# Patient Record
Sex: FEMALE | Race: WHITE | NOT HISPANIC OR LATINO | Employment: OTHER | URBAN - METROPOLITAN AREA
[De-identification: names, ages, dates, MRNs, and addresses within clinical notes are randomized per-mention and may not be internally consistent; named-entity substitution may affect disease eponyms.]

---

## 2022-03-30 ENCOUNTER — HOSPITAL ENCOUNTER (OUTPATIENT)
Facility: CLINIC | Age: 72
Setting detail: OBSERVATION
Discharge: HOME | End: 2022-03-31
Attending: EMERGENCY MEDICINE | Admitting: HOSPITALIST
Payer: MEDICARE

## 2022-03-30 ENCOUNTER — APPOINTMENT (OUTPATIENT)
Dept: GENERAL RADIOLOGY | Facility: CLINIC | Age: 72
End: 2022-03-30
Attending: EMERGENCY MEDICINE
Payer: MEDICARE

## 2022-03-30 DIAGNOSIS — S42.215A CLOSED NONDISPLACED FRACTURE OF SURGICAL NECK OF LEFT HUMERUS, UNSPECIFIED FRACTURE MORPHOLOGY, INITIAL ENCOUNTER: ICD-10-CM

## 2022-03-30 DIAGNOSIS — S42.255A CLOSED NONDISPLACED FRACTURE OF GREATER TUBEROSITY OF LEFT HUMERUS, INITIAL ENCOUNTER: ICD-10-CM

## 2022-03-30 PROCEDURE — 96374 THER/PROPH/DIAG INJ IV PUSH: CPT

## 2022-03-30 PROCEDURE — 73030 X-RAY EXAM OF SHOULDER: CPT | Mod: LT

## 2022-03-30 PROCEDURE — 250N000011 HC RX IP 250 OP 636: Performed by: EMERGENCY MEDICINE

## 2022-03-30 PROCEDURE — 250N000013 HC RX MED GY IP 250 OP 250 PS 637: Performed by: EMERGENCY MEDICINE

## 2022-03-30 PROCEDURE — 96376 TX/PRO/DX INJ SAME DRUG ADON: CPT

## 2022-03-30 PROCEDURE — 99285 EMERGENCY DEPT VISIT HI MDM: CPT | Mod: 25

## 2022-03-30 PROCEDURE — C9803 HOPD COVID-19 SPEC COLLECT: HCPCS

## 2022-03-30 RX ORDER — HYDROMORPHONE HYDROCHLORIDE 1 MG/ML
0.5 INJECTION, SOLUTION INTRAMUSCULAR; INTRAVENOUS; SUBCUTANEOUS
Status: CANCELLED | OUTPATIENT
Start: 2022-03-30

## 2022-03-30 RX ORDER — OXYCODONE AND ACETAMINOPHEN 5; 325 MG/1; MG/1
1 TABLET ORAL ONCE
Status: COMPLETED | OUTPATIENT
Start: 2022-03-30 | End: 2022-03-30

## 2022-03-30 RX ORDER — HYDROMORPHONE HYDROCHLORIDE 1 MG/ML
0.5 INJECTION, SOLUTION INTRAMUSCULAR; INTRAVENOUS; SUBCUTANEOUS
Status: DISCONTINUED | OUTPATIENT
Start: 2022-03-30 | End: 2022-03-31 | Stop reason: ALTCHOICE

## 2022-03-30 RX ADMIN — OXYCODONE HYDROCHLORIDE AND ACETAMINOPHEN 1 TABLET: 5; 325 TABLET ORAL at 23:47

## 2022-03-30 RX ADMIN — HYDROMORPHONE HYDROCHLORIDE 0.5 MG: 1 INJECTION, SOLUTION INTRAMUSCULAR; INTRAVENOUS; SUBCUTANEOUS at 23:18

## 2022-03-30 RX ADMIN — HYDROMORPHONE HYDROCHLORIDE 0.5 MG: 1 INJECTION, SOLUTION INTRAMUSCULAR; INTRAVENOUS; SUBCUTANEOUS at 22:45

## 2022-03-30 ASSESSMENT — ENCOUNTER SYMPTOMS
WEAKNESS: 0
ARTHRALGIAS: 1
NUMBNESS: 0

## 2022-03-31 ENCOUNTER — APPOINTMENT (OUTPATIENT)
Dept: OCCUPATIONAL THERAPY | Facility: CLINIC | Age: 72
End: 2022-03-31
Attending: PHYSICIAN ASSISTANT
Payer: MEDICARE

## 2022-03-31 VITALS
SYSTOLIC BLOOD PRESSURE: 142 MMHG | HEIGHT: 62 IN | DIASTOLIC BLOOD PRESSURE: 82 MMHG | HEART RATE: 74 BPM | OXYGEN SATURATION: 95 % | TEMPERATURE: 99.6 F | RESPIRATION RATE: 18 BRPM | BODY MASS INDEX: 37.73 KG/M2 | WEIGHT: 205 LBS

## 2022-03-31 PROBLEM — S42.215A CLOSED NONDISPLACED FRACTURE OF SURGICAL NECK OF LEFT HUMERUS, UNSPECIFIED FRACTURE MORPHOLOGY, INITIAL ENCOUNTER: Status: ACTIVE | Noted: 2022-03-31

## 2022-03-31 PROBLEM — S42.255A CLOSED NONDISPLACED FRACTURE OF GREATER TUBEROSITY OF LEFT HUMERUS, INITIAL ENCOUNTER: Status: ACTIVE | Noted: 2022-03-31

## 2022-03-31 LAB
ANION GAP SERPL CALCULATED.3IONS-SCNC: 10 MMOL/L (ref 3–14)
BASOPHILS # BLD AUTO: 0.1 10E3/UL (ref 0–0.2)
BASOPHILS NFR BLD AUTO: 1 %
BUN SERPL-MCNC: 14 MG/DL (ref 7–30)
CALCIUM SERPL-MCNC: 9.5 MG/DL (ref 8.5–10.1)
CHLORIDE BLD-SCNC: 95 MMOL/L (ref 94–109)
CO2 SERPL-SCNC: 27 MMOL/L (ref 20–32)
CREAT SERPL-MCNC: 0.77 MG/DL (ref 0.52–1.04)
EOSINOPHIL # BLD AUTO: 0 10E3/UL (ref 0–0.7)
EOSINOPHIL NFR BLD AUTO: 0 %
ERYTHROCYTE [DISTWIDTH] IN BLOOD BY AUTOMATED COUNT: 12.4 % (ref 10–15)
GFR SERPL CREATININE-BSD FRML MDRD: 82 ML/MIN/1.73M2
GLUCOSE BLD-MCNC: 145 MG/DL (ref 70–99)
HCT VFR BLD AUTO: 40.1 % (ref 35–47)
HGB BLD-MCNC: 12.9 G/DL (ref 11.7–15.7)
IMM GRANULOCYTES # BLD: 0.1 10E3/UL
IMM GRANULOCYTES NFR BLD: 1 %
LYMPHOCYTES # BLD AUTO: 1.1 10E3/UL (ref 0.8–5.3)
LYMPHOCYTES NFR BLD AUTO: 8 %
MCH RBC QN AUTO: 31.2 PG (ref 26.5–33)
MCHC RBC AUTO-ENTMCNC: 32.2 G/DL (ref 31.5–36.5)
MCV RBC AUTO: 97 FL (ref 78–100)
MONOCYTES # BLD AUTO: 0.8 10E3/UL (ref 0–1.3)
MONOCYTES NFR BLD AUTO: 6 %
NEUTROPHILS # BLD AUTO: 12.5 10E3/UL (ref 1.6–8.3)
NEUTROPHILS NFR BLD AUTO: 84 %
NRBC # BLD AUTO: 0 10E3/UL
NRBC BLD AUTO-RTO: 0 /100
PLAT MORPH BLD: NORMAL
PLATELET # BLD AUTO: 206 10E3/UL (ref 150–450)
POTASSIUM BLD-SCNC: 4 MMOL/L (ref 3.4–5.3)
RBC # BLD AUTO: 4.14 10E6/UL (ref 3.8–5.2)
RBC MORPH BLD: NORMAL
SARS-COV-2 RNA RESP QL NAA+PROBE: NEGATIVE
SODIUM SERPL-SCNC: 132 MMOL/L (ref 133–144)
WBC # BLD AUTO: 14.6 10E3/UL (ref 4–11)

## 2022-03-31 PROCEDURE — G0378 HOSPITAL OBSERVATION PER HR: HCPCS

## 2022-03-31 PROCEDURE — 36415 COLL VENOUS BLD VENIPUNCTURE: CPT | Performed by: EMERGENCY MEDICINE

## 2022-03-31 PROCEDURE — 80048 BASIC METABOLIC PNL TOTAL CA: CPT | Performed by: EMERGENCY MEDICINE

## 2022-03-31 PROCEDURE — U0005 INFEC AGEN DETEC AMPLI PROBE: HCPCS | Performed by: EMERGENCY MEDICINE

## 2022-03-31 PROCEDURE — 97535 SELF CARE MNGMENT TRAINING: CPT | Mod: GO

## 2022-03-31 PROCEDURE — 97165 OT EVAL LOW COMPLEX 30 MIN: CPT | Mod: GO

## 2022-03-31 PROCEDURE — 85025 COMPLETE CBC W/AUTO DIFF WBC: CPT | Performed by: EMERGENCY MEDICINE

## 2022-03-31 PROCEDURE — 250N000013 HC RX MED GY IP 250 OP 250 PS 637: Performed by: HOSPITALIST

## 2022-03-31 PROCEDURE — 99234 HOSP IP/OBS SM DT SF/LOW 45: CPT | Performed by: HOSPITALIST

## 2022-03-31 RX ORDER — ACETAMINOPHEN 325 MG/1
650 TABLET ORAL EVERY 6 HOURS PRN
Qty: 40 TABLET | Refills: 0 | Status: SHIPPED | OUTPATIENT
Start: 2022-03-31

## 2022-03-31 RX ORDER — TRIAMTERENE AND HYDROCHLOROTHIAZIDE 37.5; 25 MG/1; MG/1
1 CAPSULE ORAL DAILY
Status: DISCONTINUED | OUTPATIENT
Start: 2022-03-31 | End: 2022-03-31

## 2022-03-31 RX ORDER — NALOXONE HYDROCHLORIDE 0.4 MG/ML
0.2 INJECTION, SOLUTION INTRAMUSCULAR; INTRAVENOUS; SUBCUTANEOUS
Status: DISCONTINUED | OUTPATIENT
Start: 2022-03-31 | End: 2022-03-31 | Stop reason: HOSPADM

## 2022-03-31 RX ORDER — SIMVASTATIN 40 MG
80 TABLET ORAL AT BEDTIME
Status: DISCONTINUED | OUTPATIENT
Start: 2022-03-31 | End: 2022-03-31 | Stop reason: HOSPADM

## 2022-03-31 RX ORDER — SIMVASTATIN 80 MG
TABLET ORAL AT BEDTIME
COMMUNITY

## 2022-03-31 RX ORDER — LOSARTAN POTASSIUM 25 MG/1
25 TABLET ORAL DAILY
Status: DISCONTINUED | OUTPATIENT
Start: 2022-03-31 | End: 2022-03-31 | Stop reason: HOSPADM

## 2022-03-31 RX ORDER — IBUPROFEN 400 MG/1
400 TABLET, FILM COATED ORAL EVERY 8 HOURS PRN
Qty: 20 TABLET | Refills: 0 | Status: SHIPPED | OUTPATIENT
Start: 2022-03-31 | End: 2022-04-07

## 2022-03-31 RX ORDER — OMEGA-3 FATTY ACIDS/FISH OIL 300-1000MG
600-800 CAPSULE ORAL EVERY 6 HOURS PRN
COMMUNITY
Start: 2022-03-31 | End: 2022-03-31

## 2022-03-31 RX ORDER — ATENOLOL 100 MG/1
100 TABLET ORAL DAILY
COMMUNITY

## 2022-03-31 RX ORDER — MULTIPLE VITAMINS W/ MINERALS TAB 9MG-400MCG
1 TAB ORAL DAILY
COMMUNITY

## 2022-03-31 RX ORDER — OMEGA-3 FATTY ACIDS/FISH OIL 300-1000MG
200-400 CAPSULE ORAL EVERY 6 HOURS PRN
Status: ON HOLD | COMMUNITY
End: 2022-03-31

## 2022-03-31 RX ORDER — ATENOLOL 25 MG/1
100 TABLET ORAL DAILY
Status: DISCONTINUED | OUTPATIENT
Start: 2022-03-31 | End: 2022-03-31 | Stop reason: HOSPADM

## 2022-03-31 RX ORDER — TRIAMTERENE/HYDROCHLOROTHIAZID 37.5-25 MG
1 TABLET ORAL DAILY
Status: DISCONTINUED | OUTPATIENT
Start: 2022-03-31 | End: 2022-03-31 | Stop reason: HOSPADM

## 2022-03-31 RX ORDER — LOSARTAN POTASSIUM 25 MG/1
25 TABLET ORAL DAILY
COMMUNITY

## 2022-03-31 RX ORDER — ACETAMINOPHEN 325 MG/1
650 TABLET ORAL EVERY 6 HOURS PRN
COMMUNITY
Start: 2022-03-31 | End: 2022-03-31

## 2022-03-31 RX ORDER — TRIAMTERENE AND HYDROCHLOROTHIAZIDE 37.5; 25 MG/1; MG/1
1 CAPSULE ORAL DAILY
COMMUNITY

## 2022-03-31 RX ORDER — NALOXONE HYDROCHLORIDE 0.4 MG/ML
0.4 INJECTION, SOLUTION INTRAMUSCULAR; INTRAVENOUS; SUBCUTANEOUS
Status: DISCONTINUED | OUTPATIENT
Start: 2022-03-31 | End: 2022-03-31 | Stop reason: HOSPADM

## 2022-03-31 RX ORDER — OXYCODONE HYDROCHLORIDE 5 MG/1
5 TABLET ORAL EVERY 4 HOURS PRN
Qty: 20 TABLET | Refills: 0 | Status: SHIPPED | OUTPATIENT
Start: 2022-03-31

## 2022-03-31 RX ORDER — ONDANSETRON 2 MG/ML
4 INJECTION INTRAMUSCULAR; INTRAVENOUS EVERY 6 HOURS PRN
Status: DISCONTINUED | OUTPATIENT
Start: 2022-03-31 | End: 2022-03-31 | Stop reason: HOSPADM

## 2022-03-31 RX ORDER — ACETAMINOPHEN 650 MG/1
650 SUPPOSITORY RECTAL EVERY 6 HOURS PRN
Status: DISCONTINUED | OUTPATIENT
Start: 2022-03-31 | End: 2022-03-31 | Stop reason: HOSPADM

## 2022-03-31 RX ORDER — ONDANSETRON 4 MG/1
4 TABLET, ORALLY DISINTEGRATING ORAL EVERY 6 HOURS PRN
Status: DISCONTINUED | OUTPATIENT
Start: 2022-03-31 | End: 2022-03-31 | Stop reason: HOSPADM

## 2022-03-31 RX ORDER — OXYCODONE HYDROCHLORIDE 5 MG/1
5 TABLET ORAL EVERY 4 HOURS PRN
Status: DISCONTINUED | OUTPATIENT
Start: 2022-03-31 | End: 2022-03-31 | Stop reason: HOSPADM

## 2022-03-31 RX ORDER — ACETAMINOPHEN 325 MG/1
650 TABLET ORAL EVERY 6 HOURS PRN
Status: DISCONTINUED | OUTPATIENT
Start: 2022-03-31 | End: 2022-03-31 | Stop reason: HOSPADM

## 2022-03-31 RX ORDER — AMOXICILLIN 250 MG
2 CAPSULE ORAL 2 TIMES DAILY PRN
Status: DISCONTINUED | OUTPATIENT
Start: 2022-03-31 | End: 2022-03-31 | Stop reason: HOSPADM

## 2022-03-31 RX ORDER — HYDROMORPHONE HCL IN WATER/PF 6 MG/30 ML
0.2 PATIENT CONTROLLED ANALGESIA SYRINGE INTRAVENOUS
Status: DISCONTINUED | OUTPATIENT
Start: 2022-03-31 | End: 2022-03-31 | Stop reason: HOSPADM

## 2022-03-31 RX ORDER — AMOXICILLIN 250 MG
1 CAPSULE ORAL 2 TIMES DAILY PRN
Status: DISCONTINUED | OUTPATIENT
Start: 2022-03-31 | End: 2022-03-31 | Stop reason: HOSPADM

## 2022-03-31 RX ADMIN — OXYCODONE HYDROCHLORIDE 5 MG: 5 TABLET ORAL at 05:48

## 2022-03-31 RX ADMIN — ACETAMINOPHEN 650 MG: 325 TABLET ORAL at 13:25

## 2022-03-31 RX ADMIN — OXYCODONE HYDROCHLORIDE 5 MG: 5 TABLET ORAL at 13:25

## 2022-03-31 RX ADMIN — TRIAMTERENE AND HYDROCHLOROTHIAZIDE 1 TABLET: 37.5; 25 TABLET ORAL at 09:40

## 2022-03-31 RX ADMIN — OXYCODONE HYDROCHLORIDE 5 MG: 5 TABLET ORAL at 09:40

## 2022-03-31 RX ADMIN — ATENOLOL 100 MG: 25 TABLET ORAL at 09:39

## 2022-03-31 NOTE — PHARMACY-ADMISSION MEDICATION HISTORY
Pharmacy Medication History  Admission medication history interview status for the 3/30/2022  admission is complete. See EPIC admission navigator for prior to admission medications     Location of Interview: Phone  Medication history sources: Patient, Surescripts and Care Everywhere    Significant changes made to the medication list:  Added all     In the past week, patient estimated taking medication this percent of the time: greater than 90%    Additional medication history information:   Patient is a reliable historian. Stated in interview taking losartan and simvastatin in ED (not on MAR, appears patient took from home supply).     Medication reconciliation completed by provider prior to medication history? No    Time spent in this activity: 15 minutes    Prior to Admission medications    Medication Sig Last Dose Taking? Auth Provider   atenolol (TENORMIN) 100 MG tablet Take 100 mg by mouth daily 3/30/2022 at AM Yes Unknown, Entered By History   CALCIUM-VITAMIN D PO Take 1 tablet by mouth daily Unknown tablet strength 3/30/2022 at AM Yes Unknown, Entered By History   ibuprofen (ADVIL/MOTRIN) 200 MG capsule Take 200-400 mg by mouth every 6 hours as needed for fever Unknown at PRN Yes Unknown, Entered By History   losartan (COZAAR) 25 MG tablet Take 25 mg by mouth daily 3/30/2022 at PM (in ED) Yes Unknown, Entered By History   MILK THISTLE PO Take 1 capsule by mouth 2 times daily Unknown capsule strength, varies by brand per patient 3/30/2022 at AM Yes Unknown, Entered By History   multivitamin w/minerals (THERA-VIT-M) tablet Take 1 tablet by mouth daily 3/30/2022 at AM Yes Unknown, Entered By History   simvastatin (ZOCOR) 80 MG tablet Take by mouth At Bedtime 3/30/2022 at PM (in ED) Yes Unknown, Entered By History   triamterene-HCTZ (DYAZIDE) 37.5-25 MG capsule Take 1 capsule by mouth daily 3/30/2022 at AM Yes Unknown, Entered By History       The information provided in this note is only as accurate as the  sources available at the time of update(s)   Teresita BertrandD

## 2022-03-31 NOTE — DISCHARGE SUMMARY
[facility]  Hospitalist Discharge Summary      Date of Admission:  3/30/2022  Date of Discharge:  3/31/2022  Discharging Provider: Giuliana Gunter MD  Discharge Service: Hospitalist Service    Discharge Diagnoses     Left minimally displaced fracture of surgical neck and greater tuberosity of the humerus    Follow-ups Needed After Discharge   Follow-up Appointments     Follow-up and recommended labs and tests       Follow up with UE specialist, at (location with clinic name or city) [facility], within 2-3 weeks  to evaluate after surgery. No follow   up labs or test are needed.         {Additional follow-up instructions/to-do's for PCP        Unresulted Labs Ordered in the Past 30 Days of this Admission     No orders found for last 31 day(s).      These results will be followed up by     Discharge Disposition   Discharged to home  Condition at discharge: Stable        Hospital Course     Arabella Escamilla is a 71 year old female with a past medical history of hyperlipidemia, hypertension, anxiety, osteoporosis presents to hospital with a  Fall and was diagnosed to have Left minimally displaced fracture of surgical neck and greater tuberosity of the humerus. She was put in left arm sling and was started on pain control and ortho was consulted along with OT and her home meds were restarted. Patient will need to follow with ortho as outpatient and discharge instructions were given and she understood.     On day of discharge she was in agreement with going home     Less than 30 min spent in the discharge     Consultations This Hospital Stay   ORTHOPEDIC SURGERY IP CONSULT  OCCUPATIONAL THERAPY ADULT IP CONSULT    Code Status   Full Code    Time Spent on this Encounter   IGiuliana MD, personally saw the patient today and spent less than or equal to 30 minutes discharging this patient.       MD SG [facility]  [address]  [place] [address]  Phone: [phone]  Fax: [phone]  ______________________________________________________________________    Physical Exam   Vital Signs: Temp: 99.6  F (37.6  C) Temp src: Oral BP: (!) 142/82 Pulse: 74   Resp: 18 SpO2: 95 % O2 Device: None (Room air) Oxygen Delivery: 2 LPM  Weight: 205 lbs 0 oz        General: Patient appears comfortable and in no acute distress.  HEENT: Head is atraumatic, normocephalic.  Pupils are equal, round and reactive to light.  No scleral icterus. Oral mucosa is moist   Neck: Neck is supple and Lymphadenopathy   Respiratory: Lungs are clear to auscultation bilaterally with wheeze or crackles   Cardiovascular: Regular rate , S1 and S2 normal with no murmer or rubs or gallops  Abdomen:   soft , non tender , non distended and bowel sound present   Skin: No skin rashes or lesions to inspection or palpation.  Neurologic: Higher functions are within normal limits. No obvious defects in speech, language and memory. No facial droop  Musculoskeletal: Normal Range of motion over upper and lower extremities bilaterally and left arm has sling and rom are restricted   Psychiatric: cooperative        Primary Care Physician   Physician No Ref-Primary    Discharge Orders      Reason for your hospital stay    Left proximal humerus fracture     Follow-up and recommended labs and tests     Follow up with UE specialist, at (location with clinic name or city) [facility], within 2-3 weeks  to evaluate after surgery. No follow up labs or test are needed.     Activity    Your activity upon discharge: activity as tolerated, ambulate in house, and no driving while on analgesics; non-WB left UE. Continue sling     Diet    Follow this diet upon discharge: Orders Placed This Encounter      Regular Diet Adult       Significant Results and Procedures   Most Recent 3 CBC's:Recent Labs   Lab Test 03/31/22  0102   WBC 14.6*   HGB 12.9   MCV 97        Most Recent 3  BMP's:Recent Labs   Lab Test 03/31/22  0102   *   POTASSIUM 4.0   CHLORIDE 95   CO2 27   BUN 14   CR 0.77   ANIONGAP 10   DAVIN 9.5   *   ,   Results for orders placed or performed during the hospital encounter of 03/30/22   XR Shoulder Left G/E 3 Views    Narrative    EXAM: XR SHOULDER LEFT G/E 3 VIEWS  LOCATION: [facility]  DATE/TIME: 3/30/2022 9:42 PM    INDICATION: fall, pain  COMPARISON: None.      Impression    IMPRESSION: Minimally displaced fracture surgical neck and greater tuberosity of the humerus. No dislocation.        Discharge Medications   Current Discharge Medication List      START taking these medications    Details   acetaminophen (TYLENOL) 325 MG tablet Take 2 tablets (650 mg) by mouth every 6 hours as needed for mild pain or other (and adjunct with moderate or severe pain or per patient request)  Qty: 40 tablet, Refills: 0    Associated Diagnoses: Closed nondisplaced fracture of surgical neck of left humerus, unspecified fracture morphology, initial encounter; Closed nondisplaced fracture of greater tuberosity of left humerus, initial encounter      ibuprofen (ADVIL/MOTRIN) 400 MG tablet Take 1 tablet (400 mg) by mouth every 8 hours as needed for moderate pain  Qty: 20 tablet, Refills: 0    Associated Diagnoses: Closed nondisplaced fracture of surgical neck of left humerus, unspecified fracture morphology, initial encounter      oxyCODONE (ROXICODONE) 5 MG tablet Take 1 tablet (5 mg) by mouth every 4 hours as needed for moderate to severe pain  Qty: 20 tablet, Refills: 0    Associated Diagnoses: Closed nondisplaced fracture of surgical neck of left humerus, unspecified fracture morphology, initial encounter; Closed nondisplaced fracture of greater tuberosity of left humerus, initial encounter         CONTINUE these medications which have NOT CHANGED    Details   atenolol (TENORMIN) 100 MG tablet Take 100 mg by mouth daily      CALCIUM-VITAMIN D PO Take 1  tablet by mouth daily Unknown tablet strength      losartan (COZAAR) 25 MG tablet Take 25 mg by mouth daily      MILK THISTLE PO Take 1 capsule by mouth 2 times daily Unknown capsule strength, varies by brand per patient      multivitamin w/minerals (THERA-VIT-M) tablet Take 1 tablet by mouth daily      simvastatin (ZOCOR) 80 MG tablet Take by mouth At Bedtime      triamterene-HCTZ (DYAZIDE) 37.5-25 MG capsule Take 1 capsule by mouth daily         STOP taking these medications       ibuprofen (ADVIL/MOTRIN) 200 MG capsule Comments:   Reason for Stopping:             Allergies   No Known Allergies

## 2022-03-31 NOTE — PROGRESS NOTES
RECEIVING UNIT ED HANDOFF REVIEW    ED Nurse Handoff Report was reviewed by: Yoselin Kern RN on March 31, 2022 at 2:25 AM

## 2022-03-31 NOTE — PROGRESS NOTES
Ortho treatment plan      Patient with non-displaced left proximal humerus fracture    Plan:  Non-operative treatment   Sling  Non-WB left UE  OK to use hand and elbow for close to body ADLs (nothing heavier than a full coffee cup  Pain medication as needed  Follow-up [facility] in 3 weeks for recheck and repeat XR; sooner with any significant increase in pain    Valerie Gutierrez PAC

## 2022-03-31 NOTE — CONSULTS
[facility]    Orthopedic Consultation    Arabella Escamilla MRN# [id number]   Age: 71 year old YOB: 1950     Date of Admission:  3/30/2022    Reason for consult: Left humerus fracture       Requesting physician: Dr. Yates       Level of consult: One-time consult to assist in determining a diagnosis and to recommend an appropriate treatment plan           Assessment and Plan:   Assessment:   Left non-displaced proximal humerus fracture, surgical neck      Plan:   Non-operative treatment   Sling  Non-WB left UE  OK to use hand and elbow for close to body ADLs (nothing heavier than a full coffee cup  Pain medication as needed  OT consult ordered  Vit D ordered  Follow-up [facility] in 3 weeks for recheck and repeat XR; sooner with any significant increase in pain; [phone]           Chief Complaint:   Left shoulder pain         History of Present Illness:   This patient is a 71 year old female who presents with the following condition requiring a hospital admission:    Patient had arrived home from vacation and [place] airport. She had immediate pain and presented to [facility] ED for further evaluation. She was admitted secondary to needing pain control.   She is not chronically anticoagulated.   She lives at home independently with her . She notes she has broken her other shoulder previously.           Past Medical History:     Past Medical History:   Diagnosis Date     Anxiety      Benign essential hypertension      Hyperlipidemia LDL goal <100      Osteoporosis              Past Surgical History:     Past Surgical History:   Procedure Laterality Date     CHOLECYSTECTOMY               Social History:     Social History     Tobacco Use     Smoking status: Not on file     Smokeless tobacco: Not on file   Substance Use Topics     Alcohol use: Not on file             Family History:     Family History   Problem Relation Age of Onset     Coronary  Artery Disease Father              Immunizations:     VACCINE/DOSE   Diptheria   DPT   DTAP   HBIG   Hepatitis A   Hepatitis B   HIB   Influenza   Measles   Meningococcal   MMR   Mumps   Pneumococcal   Polio   Rubella   Small Pox   TDAP   Varicella   Zoster             Allergies:   No Known Allergies          Medications:     Current Facility-Administered Medications   Medication     acetaminophen (TYLENOL) tablet 650 mg    Or     acetaminophen (TYLENOL) Suppository 650 mg     atenolol (TENORMIN) tablet 100 mg     HYDROmorphone (DILAUDID) injection 0.2 mg     HYDROmorphone (PF) (DILAUDID) injection 0.5 mg     losartan (COZAAR) tablet 25 mg     melatonin tablet 1 mg     naloxone (NARCAN) injection 0.2 mg    Or     naloxone (NARCAN) injection 0.4 mg    Or     naloxone (NARCAN) injection 0.2 mg    Or     naloxone (NARCAN) injection 0.4 mg     ondansetron (ZOFRAN-ODT) ODT tab 4 mg    Or     ondansetron (ZOFRAN) injection 4 mg     oxyCODONE (ROXICODONE) tablet 5 mg     senna-docusate (SENOKOT-S/PERICOLACE) 8.6-50 MG per tablet 1 tablet    Or     senna-docusate (SENOKOT-S/PERICOLACE) 8.6-50 MG per tablet 2 tablet     simvastatin (ZOCOR) tablet 80 mg     triamterene-HCTZ (MAXZIDE-25) 37.5-25 MG per tablet 1 tablet             Review of Systems:   CV: NEGATIVE for chest pain, palpitations or peripheral edema  C: NEGATIVE for fever, chills, change in weight  E/M: NEGATIVE for ear, mouth and throat problems  R: NEGATIVE for significant cough or SOB          Physical Exam:   All vitals have been reviewed  Patient Vitals for the past 24 hrs:   BP Temp Temp src Pulse Resp SpO2 Height Weight   03/31/22 1248 (!) 142/82 99.6  F (37.6  C) Oral 74 18 95 % -- --   03/31/22 1025 -- -- -- -- 16 -- -- --   03/31/22 0940 -- -- -- -- 18 -- -- --   03/31/22 0754 (!) 159/89 98.8  F (37.1  C) Oral 83 20 93 % -- --   03/31/22 0244 (!) 141/68 -- -- 84 16 95 % -- --   03/31/22 0105 (!) 154/86 -- -- -- -- 97 % -- --   03/31/22 0100 (!) 162/84 --  "-- 83 -- 97 % -- --   03/31/22 0056 -- -- -- -- -- 96 % -- --   03/31/22 0040 -- -- -- -- -- (!) 89 % -- --   03/31/22 0031 -- -- -- -- -- 96 % -- --   03/31/22 0020 -- -- -- -- -- 97 % -- --   03/31/22 0000 -- -- -- -- -- 98 % -- --   03/30/22 2334 -- -- -- -- -- 98 % -- --   03/30/22 2321 -- -- -- -- -- 100 % -- --   03/30/22 2300 -- -- -- -- -- -- 1.575 m (5' 2\") 93 kg (205 lb)   03/30/22 2127 (!) 166/83 98  F (36.7  C) Temporal 85 18 95 % -- --     No intake or output data in the 24 hours ending 03/31/22 1318  Sling in place  No significantly ecchymosis noted over UE  No areas of skin tear or laceration  Sensation intact in upper arm over biceps as well as in hand r/m/u nerve distribution  Able to abduct and oppose left thumb  +Radial pulse  +cap refill           Data:   All laboratory data reviewed  Results for orders placed or performed during the hospital encounter of 03/30/22   XR Shoulder Left G/E 3 Views     Status: None    Narrative    EXAM: XR SHOULDER LEFT G/E 3 VIEWS  LOCATION: [facility]  DATE/TIME: 3/30/2022 9:42 PM    INDICATION: fall, pain  COMPARISON: None.      Impression    IMPRESSION: Minimally displaced fracture surgical neck and greater tuberosity of the humerus. No dislocation.    Basic metabolic panel     Status: Abnormal   Result Value Ref Range    Sodium 132 (L) 133 - 144 mmol/L    Potassium 4.0 3.4 - 5.3 mmol/L    Chloride 95 94 - 109 mmol/L    Carbon Dioxide (CO2) 27 20 - 32 mmol/L    Anion Gap 10 3 - 14 mmol/L    Urea Nitrogen 14 7 - 30 mg/dL    Creatinine 0.77 0.52 - 1.04 mg/dL    Calcium 9.5 8.5 - 10.1 mg/dL    Glucose 145 (H) 70 - 99 mg/dL    GFR Estimate 82 >60 mL/min/1.73m2   Asymptomatic COVID-19 Virus (Coronavirus) by PCR Nasopharyngeal     Status: Normal    Specimen: Nasopharyngeal; Swab   Result Value Ref Range    SARS CoV2 PCR Negative Negative    Narrative    Testing was performed using the [employer] Xpress SARS-CoV-2 Assay on the   Cepheid Gene-Xpert " [employer]. Additional information about   this Emergency Use Authorization (EUA) assay can be found via the Lab   Guide. This test should be ordered for the detection of SARS-CoV-2 in   individuals who meet SARS-CoV-2 clinical and/or epidemiological   criteria. Test performance is unknown in asymptomatic patients. This   test is for in vitro diagnostic use under the FDA EUA for   laboratories certified under CLIA to perform high complexity testing.   This test has not been FDA cleared or approved. A negative result   does not rule out the presence of PCR inhibitors in the specimen or   target RNA in concentration below the limit of detection for the   assay. The possibility of a false negative should be considered if   the patient's recent exposure or clinical presentation suggests   COVID-19. This test was validated by the [facility]. This laboratory is certified under the Clinical Laboratory Improvement Amendments of 1988 (CLIA-88) as qualified to perform high complexity laboratory testing.     CBC with platelets and differential     Status: Abnormal   Result Value Ref Range    WBC Count 14.6 (H) 4.0 - 11.0 10e3/uL    RBC Count 4.14 3.80 - 5.20 10e6/uL    Hemoglobin 12.9 11.7 - 15.7 g/dL    Hematocrit 40.1 35.0 - 47.0 %    MCV 97 78 - 100 fL    MCH 31.2 26.5 - 33.0 pg    MCHC 32.2 31.5 - 36.5 g/dL    RDW 12.4 10.0 - 15.0 %    Platelet Count 206 150 - 450 10e3/uL    % Neutrophils 84 %    % Lymphocytes 8 %    % Monocytes 6 %    % Eosinophils 0 %    % Basophils 1 %    % Immature Granulocytes 1 %    NRBCs per 100 WBC 0 <1 /100    Absolute Neutrophils 12.5 (H) 1.6 - 8.3 10e3/uL    Absolute Lymphocytes 1.1 0.8 - 5.3 10e3/uL    Absolute Monocytes 0.8 0.0 - 1.3 10e3/uL    Absolute Eosinophils 0.0 0.0 - 0.7 10e3/uL    Absolute Basophils 0.1 0.0 - 0.2 10e3/uL    Absolute Immature Granulocytes 0.1 <=0.4 10e3/uL    Absolute NRBCs 0.0 10e3/uL   RBC and Platelet Morphology      Status: None   Result Value Ref Range    Platelet Assessment  Automated Count Confirmed. Platelet morphology is normal.     Automated Count Confirmed. Platelet morphology is normal.    RBC Morphology Confirmed RBC Indices    CBC with platelets differential     Status: Abnormal    Narrative    The following orders were created for panel order CBC with platelets differential.  Procedure                               Abnormality         Status                     ---------                               -----------         ------                     CBC with platelets and d...[[id number]]  Abnormal            Final result               RBC and Platelet Morphology[[id number]]                      Final result                 Please view results for these tests on the individual orders.          Attestation:  I have reviewed today's vital signs, notes, medications, labs and imaging with Dr. Collins.  Amount of time performed on this consult: 30 minutes.    Valerie Gutierrez PA-C

## 2022-03-31 NOTE — PROVIDER NOTIFICATION
Message left for Valerie Gutierrez to see if patient can get an OT consult. Patient is concerned that she may have issues getting dressed using proper body mechanics.

## 2022-03-31 NOTE — ED NOTES
[facility]  ED Nurse Handoff Report    ED Chief complaint: Fall and Arm Pain      ED Diagnosis:   Final diagnoses:   Closed nondisplaced fracture of surgical neck of left humerus, unspecified fracture morphology, initial encounter   Closed nondisplaced fracture of greater tuberosity of left humerus, initial encounter       Code Status: To be addressed by admitting provider.     Allergies: No Known Allergies    Patient Story: Patient reports to ED after [place] the airport sustaining a left humerus fracture.    Focused Assessment:    Respiratory - Desats to 89% following pain medication administration, placed on 2 L. Otherwise WDL.  Cardiac - WDL. Hx. Of hypertension.   Neuro - WDL  Musculoskeletal - R. Arm pain. Sling in place.   Labs Ordered and Resulted from Time of ED Arrival to Time of ED Departure   BASIC METABOLIC PANEL - Abnormal       Result Value    Sodium 132 (*)     Potassium 4.0      Chloride 95      Carbon Dioxide (CO2) 27      Anion Gap 10      Urea Nitrogen 14      Creatinine 0.77      Calcium 9.5      Glucose 145 (*)     GFR Estimate 82     COVID-19 VIRUS (CORONAVIRUS) BY PCR   CBC WITH PLATELETS AND DIFFERENTIAL     XR Shoulder Left G/E 3 Views   Final Result   IMPRESSION: Minimally displaced fracture surgical neck and greater tuberosity of the humerus. No dislocation.         Treatments and/or interventions provided: X-ray. Pain control.  Patient's response to treatments and/or interventions: Patient tolerated ED interventions appropriately.    To be done/followed up on inpatient unit:  Pain control PRN. Follow -up orthopedic consult.    Does this patient have any cognitive concerns?: N/A    Activity level - Baseline/Home:  Independent  Activity Level - Current:   Independent    Patient's Preferred language: English   Needed?: No    Isolation: None  Infection: Not Applicable  Patient tested for COVID 19 prior to admission: YES  Bariatric?: No    Vital Signs:    Vitals:    03/31/22 0040 03/31/22 0056 03/31/22 0100 03/31/22 0105   BP:   (!) 162/84 (!) 154/86   Pulse:   83    Resp:       Temp:       TempSrc:       SpO2: (!) 89% 96% 97% 97%   Weight:       Height:           Cardiac Rhythm:     Was the PSS-3 completed:   Yes  What interventions are required if any?               Family Comments:  at bedside.  OBS brochure/video discussed/provided to patient/family: Yes    For the majority of the shift this patient's behavior was Green.   Behavioral interventions performed were None needed.    ED NURSE PHONE NUMBER: [phone]

## 2022-03-31 NOTE — PLAN OF CARE
Goal Outcome Evaluation:    Patient admitted with left humerus fracture. Left arm in sling, writer instructed patient proper use and fitting of sling and importance of checking skin under straps often. ABD pads put under straps by neck to reduce discomfort. Strong hand , denies N/T, in independently in room. Patient discharged to home after being seen by OT, discharge instructions reviewed, all questions answered, medications filled here for patient.

## 2022-03-31 NOTE — ED NOTES
Bed: ED01  Expected date:   Expected time:   Means of arrival:   Comments:  HEMS [id number] left arm injury from a fall- eta 2115

## 2022-03-31 NOTE — PROGRESS NOTES
"   03/31/22 1300   Quick Adds   Type of Visit Initial Occupational Therapy Evaluation   Living Environment   People in Home spouse   Current Living Arrangements house   Living Environment Comments 1 EMERALD from garage; all on one level within the home   Self-Care   Usual Activity Tolerance excellent   Current Activity Tolerance moderate   Equipment Currently Used at Home none   Fall history within last six months yes   Number of times patient has fallen within last six months 2  (fell once in feb and this admission)   Activity/Exercise/Self-Care Comment Prior to fall pt was IND w/ baseline, walk in shower; no AD   Instrumental Activities of Daily Living (IADL)   Previous Responsibilities laundry;shopping;yardwork;medication management;driving   IADL Comments Per pt,  cooks and she helps clean up, has  that comes every few weeks. Pt does drive at baseline but  is able to be home 24/7; both are retired.    General Information   Onset of Illness/Injury or Date of Surgery 03/30/22   Referring Physician Valerie Gutierrez PA-C   Patient/Family Therapy Goal Statement (OT) \"I don't know how I am going to get myself dressed with this sling\"   Additional Occupational Profile Info/Pertinent History of Current Problem Per chart review: \"Arabella Escamilla is a 71 year old right hand dominant female with history of hypertension, hypercholesteremia, and hyperlipidemia who presents with left upper extremity pain after a mechanical fall. The patient [place] and [place] her left shoulder.\"    Existing Precautions/Restrictions fall;shoulder;weight bearing   Left Upper Extremity (Weight-bearing Status) non weight-bearing (NWB)   Cognitive Status Examination   Cognitive Status Comments WFL   Visual Perception   Impact of Vision Impairment on Function (Vision) reading glasses   Sensory   Sensory Comments increased pain w/ any movement   Pain Assessment   Patient Currently in Pain Yes, see " Vital Sign flowsheet   Integumentary/Edema   Integumentary/Edema Comments slight bruising from fall   Posture   Posture Comments no concerns   Range of Motion Comprehensive   Comment, General Range of Motion RUE WFL   Strength Comprehensive (MMT)   Comment, General Manual Muscle Testing (MMT) Assessment RUE WFL    Bed Mobility   Comment (Bed Mobility) IND supine > sit EOB, able to maintain precautions throughout    Transfers   Transfer Comments No concern   Clinical Impression   Criteria for Skilled Therapeutic Interventions Met (OT) Yes, treatment indicated   OT Diagnosis impaired ADL after fall   OT Problem List-Impairments impacting ADL problems related to;activity tolerance impaired;strength;pain;range of motion (ROM)   Assessment of Occupational Performance 3-5 Performance Deficits   Identified Performance Deficits dressing, bathing, driving, home mgmt, activity tolerance    Planned Therapy Interventions (OT) ADL retraining;IADL retraining;ROM;strengthening;home program guidelines;progressive activity/exercise;risk factor education   Clinical Decision Making Complexity (OT) low complexity   Risk & Benefits of therapy have been explained evaluation/treatment results reviewed;care plan/treatment goals reviewed;risks/benefits reviewed;current/potential barriers reviewed;participants voiced agreement with care plan;participants included;patient   OT Discharge Planning   OT Discharge Recommendation (DC Rec) home with assist   OT Rationale for DC Rec Pt is functioning below baseline but has good family support, all needs met on one level in home environment, expected to require assist with driving and IADLs; education provided on one handed techniques, sling on at all times. No additional IP OT needs   Total Evaluation Time (Minutes)   Total Evaluation Time (Minutes) 8   OT Goals   Therapy Frequency (OT) One time eval and treatment   OT Predicated Duration/Target Date for Goal Attainment 03/31/22   OT Goals Upper  Body Dressing;Toilet Transfer/Toileting;OT Goal 1   OT: Upper Body Dressing Modified independent;Minimal assist;Goal Met   OT: Toilet Transfer/Toileting Independent;toilet transfer;cleaning and garment management;within precautions;Goal Met   OT: Goal 1 Pt will verbalize 3 compensatory one handed strategies to implement upon discharge       Occupational Therapy Discharge Summary    Reason for therapy discharge:    All goals and outcomes met, no further needs identified.    Progress towards therapy goal(s). See goals on Care Plan [facility] electronic health record for goal details.  Goals met    Therapy recommendation(s):    NWB status, keep arm in sling until follow up appt; replace sling after shower, use shower bench, one handed dressing strategies, family assist with IADL tasks     Handouts issued and pt verbalizes no concern with ability to get on/off toilet or ambulation w/o assistive device.

## 2022-03-31 NOTE — H&P
[facility]    History and Physical  Hospitalist     Date of Admission:  3/30/2022    Assessment & Plan   Arabella Escamilla is a 71 year old female with a past medical history of hyperlipidemia, hypertension, anxiety, osteoporosis presents to hospital with a left humerus fracture after fall.    Left minimally displaced fracture of surgical neck and greater tuberosity of the humerus  Patient with a left humerus fracture [place].  Left arm in sling.  Admitted to hospital for pain control.  -Pain control as needed, p.o. medications when possible  -Follow-up orthopedic consult    Mild hyponatremia  -Follow-up repeat check in a.m.    Hypertension  Blood pressure elevated likely due to pain.  -Resume PTA meds once med rec is complete  -Continue with pain control    Chronic medical conditions:  Hyperlipidemia  Anxiety  Osteoporosis  -Resume PTA medications once med rec is complete    Code Status   Full Code  DVT ppx: Ambulate  Disposition Plan   Expected discharge in less than 2 days to prior living arrangement once pain is controlled.     Entered: Alexa Yates MD 03/31/2022, 12:57 AM       Alexa Yates MD, MD    Primary Care Physician   Physician No Ref-Primary    Chief Complaint   Left shoulder pain    History obtained from the patient    History of Present Illness   Arabella Escamilla is a 71 year old female with a past medical history of hypertension and hyperlipidemia presents to the hospital after a fall with left arm pain.  The patient had returned from a trip with her  and at the airport while on an escalator her  who is ahead of her had difficulty with the suitcase and fell and she fell over him.  She fell on her left side and developed immediate left shoulder pain.  She does not believe she hit her head and denies any pain of the head.  She denies any loss of consciousness.  Due to shoulder pain she presented to hospital  and was found to have a minimally displaced fracture of the humerus.  Due to poor pain control the patient was admitted to the hospital.  During my interview she reports that her pain is improving and is currently an 8 out of 10.  She reports that the pain is worse with movement.  She provides no other complaints.    Past Medical History    I have reviewed this patient's medical history and updated it with pertinent information if needed.    Hypertension   hyperlipidemia   osteoporosis   anxiety    Past Surgical History   I have reviewed this patient's surgical history and updated it with pertinent information if needed.    Cholecystectomy    Prior to Admission Medications   None     Allergies   No Known Allergies    Social History   I have reviewed this patient's social history and updated it with pertinent information if needed. Arabella AUGUSTIN Andi   She is a former smoker.  She drinks wine regularly but not every day.  1-3 drinks in a day.    Family History   I have reviewed this patient's family history and updated it with pertinent information if needed.   No pertinent family history    Review of Systems   The 5 point Review of Systems is negative other than noted in the HPI or here.     Physical Exam   Temp: 98  F (36.7  C) Temp src: Temporal BP: (!) 166/83 Pulse: 85   Resp: 18 SpO2: 96 % O2 Device: Nasal cannula Oxygen Delivery: 2 LPM  Vital Signs with Ranges  Temp:  [98  F (36.7  C)] 98  F (36.7  C)  Pulse:  [85] 85  Resp:  [18] 18  BP: (166)/(83) 166/83  SpO2:  [89 %-100 %] 96 %  205 lbs 0 oz  Physical Exam  Vitals reviewed.   Constitutional:       Comments: Pleasant lady seen resting in bed on her right side in no apparent distress.   HENT:      Head: Normocephalic and atraumatic.   Eyes:      Extraocular Movements: Extraocular movements intact.      Conjunctiva/sclera: Conjunctivae normal.      Pupils: Pupils are equal, round, and reactive to light.   Cardiovascular:      Rate and Rhythm: Normal rate and  regular rhythm.      Pulses: Normal pulses.   Pulmonary:      Effort: Pulmonary effort is normal.      Breath sounds: Normal breath sounds. No wheezing, rhonchi or rales.   Abdominal:      General: Abdomen is flat. Bowel sounds are normal.      Palpations: Abdomen is soft.   Musculoskeletal:         General: No swelling.      Comments: Left arm in a sling.  Capillary refill intact in left hand.  Sensation intact in left hand.  Deferred range of motion testing of left arm due to fracture.   Skin:     General: Skin is warm and dry.   Neurological:      General: No focal deficit present.      Mental Status: She is alert and oriented to person, place, and time. Mental status is at baseline.           Data   Results for orders placed or performed during the hospital encounter of 03/30/22 (from the past 24 hour(s))   XR Shoulder Left G/E 3 Views    Narrative    EXAM: XR SHOULDER LEFT G/E 3 VIEWS  LOCATION: [facility]  DATE/TIME: 3/30/2022 9:42 PM    INDICATION: fall, pain  COMPARISON: None.      Impression    IMPRESSION: Minimally displaced fracture surgical neck and greater tuberosity of the humerus. No dislocation.    CBC with platelets differential    Narrative    The following orders were created for panel order CBC with platelets differential.  Procedure                               Abnormality         Status                     ---------                               -----------         ------                     CBC with platelets and d...[[id number]]                      In process                   Please view results for these tests on the individual orders.

## 2022-03-31 NOTE — ED PROVIDER NOTES
"  History   Chief Complaint:  Fall and Arm Pain     The history is provided by the patient and medical records.      Arabella Escamilla is a 71 year old right hand dominant female with history of hypertension, hypercholesteremia, and hyperlipidemia who presents with left upper extremity pain after a mechanical fall. The patient [place] and [place] her left shoulder. She endorses left upper extremity pain. She denies any head injury or loss of consciousness. She denies numbness or tingling in her bilateral hands. She is not anticoagulated.     Review of Systems   Musculoskeletal: Positive for arthralgias (left upper extremity).   Neurological: Negative for syncope, weakness and numbness.   All other systems reviewed and are negative.    Allergies:  The patient has no known allergies.     Medications:  Cozaar   Simvastatin   Atenolol     Past Medical History:     Hypertension  Hypercholesteremia  Osteoporosis     Hyperlipidemia   Thyroid nodule   Anxiety     Past Surgical History:    Cholecystectomy    Cataract removal     Family History:    Mother - Lung Cancer  Father - Heart Failure  Brother - Hyperlipidemia     Social History:  The patient arrived to the emergency department via EMS.    Physical Exam     Patient Vitals for the past 24 hrs:   BP Temp Temp src Pulse Resp SpO2 Height Weight   03/31/22 0031 -- -- -- -- -- 96 % -- --   03/31/22 0020 -- -- -- -- -- 97 % -- --   03/31/22 0000 -- -- -- -- -- 98 % -- --   03/30/22 2334 -- -- -- -- -- 98 % -- --   03/30/22 2321 -- -- -- -- -- 100 % -- --   03/30/22 2300 -- -- -- -- -- -- 1.575 m (5' 2\") 93 kg (205 lb)   03/30/22 2127 (!) 166/83 98  F (36.7  C) Temporal 85 18 95 % -- --     Physical Exam  General/Appearance: appears stated age, well-groomed, appears uncomfortable  Eyes: EOMI, no scleral injection, no icterus  ENT: MMM  Neck: supple, nl ROM, no stiffness  Cardiovascular: RRR, nl S1S2, no m/r/g, 2+ pulses in all 4 extremities, cap " refill <2sec  Respiratory: CTAB, good air movement throughout, no wheezes/rhonchi/rales, no increased WOB, no retractions  MSK: unable to move LUE 2/2 pain, pain with palpation of L shoulder joint  Skin: warm and well-perfused, no rash, no edema, no ecchymosis, nl turgor  Neuro: GCS 15, alert and oriented, no gross focal neuro deficits  Psych: interacts appropriately  Heme: no petechia, no purpura, no active bleeding    Emergency Department Course   Imaging:  XR Shoulder Left G/E 3 Views   Final Result   IMPRESSION: Minimally displaced fracture surgical neck and greater tuberosity of the humerus. No dislocation.       Report per radiology    Laboratory:  Labs Ordered and Resulted from Time of ED Arrival to Time of ED Departure - No data to display   CBC and BMP pending.   COVID swab pending.     Emergency Department Course:     Reviewed:  I reviewed nursing notes, vitals, past medical history and Care Everywhere    Assessments:  2201 I obtained history and examined the patient as noted above.   2303 I rechecked the patient and explained findings.  2354 I rechecked the patient.  0008 I rechecked the patient and explained plan of care.  is at bedside.     Consults:  0038 I spoke to Dr. Yates of the hospitalist service who accepts the patient for admission.     Interventions:  2245 Dilaudid 0.5 mg IV  2318 Dilaudid 0.5 mg IV  2347 Percocet 5-325 mg tablet, 1 tablet PO    Disposition:  The patient was admitted to the hospital under the care of Dr. Yates.     Impression & Plan   Medical Decision Making:  This patient is a pleasant 71-year-old female who presents after mechanical fall onto her left shoulder.  X-ray confirms a surgical neck fracture as well as a greater tuberosity fracture.  She is got neurovascular that is intact distal to the injury.  Here after 2 doses of Dilaudid and tablet of Percocet she still states her pain is bad enough that she does not think she is able to go home and care for  herself.   is at the bedside and agrees.  We will bring her into observation for symptom control.    Diagnosis:    ICD-10-CM    1. Closed nondisplaced fracture of surgical neck of left humerus, unspecified fracture morphology, initial encounter  S42.215A    2. Closed nondisplaced fracture of greater tuberosity of left humerus, initial encounter  S42.255A      Scribe Disclosure:  I, Zeina Portillo, am serving as a scribe at 10:01 PM on 3/30/2022 to document services personally performed by Jodee Wilcox MD based on my observations and the provider's statements to me.     Jodee Wilcox MD  03/31/22 0110

## 2022-04-23 ENCOUNTER — HEALTH MAINTENANCE LETTER (OUTPATIENT)
Age: 72
End: 2022-04-23

## 2022-10-30 ENCOUNTER — HEALTH MAINTENANCE LETTER (OUTPATIENT)
Age: 72
End: 2022-10-30

## 2023-11-12 ENCOUNTER — HEALTH MAINTENANCE LETTER (OUTPATIENT)
Age: 73
End: 2023-11-12

## 2024-08-03 NOTE — ED TRIAGE NOTES
[place].  Complaining of left arm pain.  100mg fent and 2.5mg droperidol given PTA.  Does admit to alcohol today.    
25

## 2024-12-28 ENCOUNTER — HEALTH MAINTENANCE LETTER (OUTPATIENT)
Age: 74
End: 2024-12-28